# Patient Record
Sex: FEMALE | Race: WHITE
[De-identification: names, ages, dates, MRNs, and addresses within clinical notes are randomized per-mention and may not be internally consistent; named-entity substitution may affect disease eponyms.]

---

## 2022-01-06 ENCOUNTER — HOSPITAL ENCOUNTER (EMERGENCY)
Dept: HOSPITAL 11 - JP.ED | Age: 32
Discharge: HOME | End: 2022-01-06
Payer: SELF-PAY

## 2022-01-06 DIAGNOSIS — Z20.822: ICD-10-CM

## 2022-01-06 DIAGNOSIS — J18.9: Primary | ICD-10-CM

## 2022-01-06 DIAGNOSIS — Z72.0: ICD-10-CM

## 2022-01-06 LAB — SARS-COV-2 RNA RESP QL NAA+PROBE: NEGATIVE

## 2022-01-06 PROCEDURE — 81001 URINALYSIS AUTO W/SCOPE: CPT

## 2022-01-06 PROCEDURE — 99284 EMERGENCY DEPT VISIT MOD MDM: CPT

## 2022-01-06 PROCEDURE — 80053 COMPREHEN METABOLIC PANEL: CPT

## 2022-01-06 PROCEDURE — 36415 COLL VENOUS BLD VENIPUNCTURE: CPT

## 2022-01-06 PROCEDURE — 0241U: CPT

## 2022-01-06 PROCEDURE — 71046 X-RAY EXAM CHEST 2 VIEWS: CPT

## 2022-01-06 PROCEDURE — 85025 COMPLETE CBC W/AUTO DIFF WBC: CPT

## 2022-01-06 NOTE — EDM.PDOC
ED HPI GENERAL MEDICAL PROBLEM





- General


Chief Complaint: Respiratory Problem


Stated Complaint: COUGH, FEVER, SHAKY


Time Seen by Provider: 01/06/22 17:10


Source of Information: Reports: Patient, Family


History Limitations: Reports: No Limitations





- History of Present Illness


INITIAL COMMENTS - FREE TEXT/NARRATIVE: 





31-year-old female presents with a cough, malaise, nausea and generalized body 

aches for the past 5 days.  She was seen in the [place] emergency room last 

night but they just checked her for Covid and sent her home, she does not feel 

like she got worked up and taken seriously.  She feels moderately short of 

breath, she is unvaccinated.  She is a smoker.  Intermittent fevers and chills.


Onset: Gradual


Duration: Day(s): (Symptoms for 5 days)


Worsens with: Reports: Other (Activity causes increased fatigue and shortness of

breath)


Associated Symptoms: Reports: Chest Pain, Cough, Fever/Chills, Malaise, 

Shortness of Breath, Weakness.  Denies: Headaches, Loss of Appetite, 

Nausea/Vomiting





- Related Data


                                    Allergies











Allergy/AdvReac Type Severity Reaction Status Date / Time


 


No Known Allergies Allergy   Verified 01/06/22 16:37











Home Meds: 


                                    Home Meds





Ascorbic Acid [Vitamin C]  01/06/22 [History]


Escitalopram Oxalate [Lexapro]  01/06/22 [History]


cloNIDine [Catapres] 0.2 mg PO DAILY 01/06/22 [History]











Past Medical History


Respiratory History: Reports: Bronchitis, Recurrent





Social & Family History





- Tobacco Use


Tobacco Use Status *Q: Current Every Day Tobacco User


Years of Tobacco use: 13


Packs/Tins Daily: 1





- Recreational Drug Use


Recreational Drug Use: Yes


Recreational Drug Type: Reports: Marijuana/Hashish





ED ROS GENERAL





- Review of Systems


Review Of Systems: See Below


Constitutional: Denies: Fever, Chills


HEENT: Reports: Rhinitis, Other (Some hoarseness of her voice over the past 2 or

 3 days.).  Denies: Throat Pain


Respiratory: Reports: Shortness of Breath, Cough.  Denies: Sputum


Cardiovascular: Reports: Chest Pain (With cough)


GI/Abdominal: Reports: No Symptoms


: Reports: Other (Urine is dark, no dysuria)


Musculoskeletal: Reports: Muscle Pain (Generalized muscle aches and pains)


Skin: Reports: No Symptoms


Neurological: Reports: Headache (Mild headache, weakness)


Psychiatric: Reports: No Symptoms





ED EXAM, GENERAL





- Physical Exam


Exam: See Below


Exam Limited By: No Limitations


General Appearance: Alert, No Apparent Distress, Other (Patient looks tired but 

not distressed)


Eye Exam: Bilateral Eye: Normal Inspection


Head: Atraumatic


Neck: Non-Tender


Respiratory/Chest: No Respiratory Distress, Lungs Clear


Cardiovascular: Regular Rate, Rhythm


GI/Abdominal: Soft, Tender (She does react with some mild to moderate tenderness

 to palpation of the abdomen diffusely, no focal guarding or rebound tenderness)


Extremities: Normal Inspection


Neurological: Alert, Oriented


Psychiatric: Anxious


Skin Exam: Warm, Dry





Course





- Vital Signs


Last Recorded V/S: 


                                Last Vital Signs











Temp  98.2 F   01/06/22 16:35


 


Pulse  80   01/06/22 16:35


 


Resp  18   01/06/22 16:35


 


BP  158/59 H  01/06/22 16:35


 


Pulse Ox  97   01/06/22 16:35














- Orders/Labs/Meds


Orders: 


                               Active Orders 24 hr











 Category Date Time Status


 


 Isolation [COMM] Stat Oth  01/06/22 16:30 Ordered


 


 Isolation [COMM] Stat Ot  01/06/22 17:18 Ordered











Labs: 


                                Laboratory Tests











  01/06/22 01/06/22 01/06/22 Range/Units





  16:52 17:25 17:26 


 


WBC    14.2 H  (4.5-11.0)  K/uL


 


RBC    4.21  (3.30-5.50)  M/uL


 


Hgb    13.1  (12.0-15.0)  g/dL


 


Hct    38.6  (36.0-48.0)  %


 


MCV    92  (80-98)  fL


 


MCH    31  (27-31)  pg


 


MCHC    34  (32-36)  %


 


Plt Count    272  (150-400)  K/uL


 


Neut % (Auto)    54.4  (36-66)  %


 


Lymph % (Auto)    34.5  (24-44)  %


 


Mono % (Auto)    6.3 H  (2-6)  %


 


Eos % (Auto)    3.5  (2-4)  %


 


Baso % (Auto)    0.7  (0-1)  %


 


Sodium     (140-148)  mmol/L


 


Potassium     (3.6-5.2)  mmol/L


 


Chloride     (100-108)  mmol/L


 


Carbon Dioxide     (21-32)  mmol/L


 


Anion Gap     (5.0-14.0)  mmol/L


 


BUN     (7-18)  mg/dL


 


Creatinine     (0.6-1.0)  mg/dL


 


Est Cr Clr Drug Dosing     mL/min


 


Estimated GFR (MDRD)     (>60)  


 


Glucose     ()  mg/dL


 


Calcium     (8.5-10.1)  mg/dL


 


Total Bilirubin     (0.2-1.0)  mg/dL


 


AST     (15-37)  U/L


 


ALT     (12-78)  U/L


 


Alkaline Phosphatase     ()  U/L


 


Total Protein     (6.4-8.2)  g/dL


 


Albumin     (3.4-5.0)  g/dL


 


Globulin     (2.3-3.5)  g/dL


 


Albumin/Globulin Ratio     (1.2-2.2)  


 


Urine Color   Yellow   (YELLOW)  


 


Urine Appearance   Slightly cloudy A   (CLEAR)  


 


Urine pH   6.0   (5.0-8.0)  


 


Ur Specific Gravity   >= 1.030   (1.008-1.030)  


 


Urine Protein   Negative   (NEGATIVE)  mg/dL


 


Urine Glucose (UA)   Negative   (NEGATIVE)  mg/dL


 


Urine Ketones   Negative   (NEGATIVE)  mg/dL


 


Urine Occult Blood   Negative   (NEGATIVE)  


 


Urine Nitrite   Negative   (NEGATIVE)  


 


Urine Bilirubin   Negative   (NEGATIVE)  


 


Urine Urobilinogen   2.0 H   (0.2-1.0)  EU/dL


 


Ur Leukocyte Esterase   Negative   (NEGATIVE)  


 


Urine RBC   0-5   (0-5)  


 


Urine WBC   0-5   (0-5)  


 


Ur Epithelial Cells   Few   


 


Amorphous Sediment   Not seen   


 


Urine Bacteria   Few   


 


Urine Mucus   Few   


 


Influenza Type A RNA  Negative    (NEGATIVE)  


 


RSV RNA (INAAT)  Negative    (NEGATIVE)  


 


Influenza Type B RNA  Negative    (NEGATIVE)  


 


SARS-CoV-2 RNA (AL)  Negative    (NEGATIVE)  














  01/06/22 Range/Units





  17:26 


 


WBC   (4.5-11.0)  K/uL


 


RBC   (3.30-5.50)  M/uL


 


Hgb   (12.0-15.0)  g/dL


 


Hct   (36.0-48.0)  %


 


MCV   (80-98)  fL


 


MCH   (27-31)  pg


 


MCHC   (32-36)  %


 


Plt Count   (150-400)  K/uL


 


Neut % (Auto)   (36-66)  %


 


Lymph % (Auto)   (24-44)  %


 


Mono % (Auto)   (2-6)  %


 


Eos % (Auto)   (2-4)  %


 


Baso % (Auto)   (0-1)  %


 


Sodium  139 L  (140-148)  mmol/L


 


Potassium  3.4 L  (3.6-5.2)  mmol/L


 


Chloride  102  (100-108)  mmol/L


 


Carbon Dioxide  30  (21-32)  mmol/L


 


Anion Gap  10.4  (5.0-14.0)  mmol/L


 


BUN  13  (7-18)  mg/dL


 


Creatinine  0.6  (0.6-1.0)  mg/dL


 


Est Cr Clr Drug Dosing  116.24  mL/min


 


Estimated GFR (MDRD)  > 60  (>60)  


 


Glucose  71 L  ()  mg/dL


 


Calcium  8.6  (8.5-10.1)  mg/dL


 


Total Bilirubin  0.4  (0.2-1.0)  mg/dL


 


AST  26  (15-37)  U/L


 


ALT  29  (12-78)  U/L


 


Alkaline Phosphatase  102  ()  U/L


 


Total Protein  7.6  (6.4-8.2)  g/dL


 


Albumin  3.8  (3.4-5.0)  g/dL


 


Globulin  3.8 H  (2.3-3.5)  g/dL


 


Albumin/Globulin Ratio  1.0 L  (1.2-2.2)  


 


Urine Color   (YELLOW)  


 


Urine Appearance   (CLEAR)  


 


Urine pH   (5.0-8.0)  


 


Ur Specific Gravity   (1.008-1.030)  


 


Urine Protein   (NEGATIVE)  mg/dL


 


Urine Glucose (UA)   (NEGATIVE)  mg/dL


 


Urine Ketones   (NEGATIVE)  mg/dL


 


Urine Occult Blood   (NEGATIVE)  


 


Urine Nitrite   (NEGATIVE)  


 


Urine Bilirubin   (NEGATIVE)  


 


Urine Urobilinogen   (0.2-1.0)  EU/dL


 


Ur Leukocyte Esterase   (NEGATIVE)  


 


Urine RBC   (0-5)  


 


Urine WBC   (0-5)  


 


Ur Epithelial Cells   


 


Amorphous Sediment   


 


Urine Bacteria   


 


Urine Mucus   


 


Influenza Type A RNA   (NEGATIVE)  


 


RSV RNA (INAAT)   (NEGATIVE)  


 


Influenza Type B RNA   (NEGATIVE)  


 


SARS-CoV-2 RNA (AL)   (NEGATIVE)  














- Re-Assessments/Exams


Free Text/Narrative Re-Assessment/Exam: 





01/06/22 17:55


2 view chest x-ray shows a likely bilateral hilar infiltrates, viral studies are

 negative.  White count is elevated at 14,000, CMP is basically normal, there is

 no infection in the urine but she is very concentrated.  Patient was placed on 

a Z-Anjel, given Robitussin-DAC and Tessalon Perles for cough suppression and will

 concentrate on hydration.  She can recheck next week if not improving 

satisfactorily.








Departure





- Departure


Time of Disposition: 18:34


Disposition: Home, Self-Care 01


Clinical Impression: 


Pneumonia


Qualifiers:


 Pneumonia type: due to unspecified organism Laterality: right Lung location: 

middle lobe of lung Qualified Code(s): J18.9 - Pneumonia, unspecified organism








- Discharge Information


Instructions:  Community-Acquired Pneumonia, Adult, Easy-to-Read


Referrals: 


MIRZA VERDUZCO [Other]


Forms:  ED Department Discharge


Care Plan Goals: 


Take Zithromax as prescribed starting with 2 pills tonight, use Robitussin and 

Tessalon for cough suppression.  Concentrate on hydration, and increase activity

 as tolerated.  Consider rechecking in 3 or 4 days if not improving 

satisfactorily, return anytime if worsening, especially difficulty breathing.





Sepsis Event Note (ED)





- Evaluation


Sepsis Screening Result: No Definite Risk





- My Orders


Last 24 Hours: 


My Active Orders





01/06/22 16:30


Isolation [COMM] Stat 





01/06/22 17:18


Isolation [COMM] Stat 














- Assessment/Plan


Last 24 Hours: 


My Active Orders





01/06/22 16:30


Isolation [COMM] Stat 





01/06/22 17:18


Isolation [COMM] Stat

## 2022-01-07 NOTE — CR
CHEST: 2 view

 

CLINICAL HISTORY:Cough and dyspnea

 

COMPARISON:None

 

FINDINGS:  Heart size and pulmonary vascularity are normal. There is diffuse

increase in lung markings bilaterally, particularly in the perihilar region.

There are no effusions.

 

Impression: Generalized increase in lung markings is most suggestive of

pneumonitis

## 2022-01-10 ENCOUNTER — HOSPITAL ENCOUNTER (EMERGENCY)
Dept: HOSPITAL 11 - JP.ED | Age: 32
Discharge: HOME | End: 2022-01-10
Payer: SELF-PAY

## 2022-01-10 DIAGNOSIS — Z72.0: ICD-10-CM

## 2022-01-10 DIAGNOSIS — J98.01: Primary | ICD-10-CM

## 2022-01-10 NOTE — EDM.PDOC
ED HPI GENERAL MEDICAL PROBLEM





- General


Chief Complaint: Respiratory Problem


Stated Complaint: HEAVINESS IN CHEST


Time Seen by Provider: 01/10/22 21:35


Source of Information: Reports: Patient, Old Records, RN


History Limitations: Reports: No Limitations





- History of Present Illness


INITIAL COMMENTS - FREE TEXT/NARRATIVE: 





30 yo female smoker presents with mild chest tightness. Was seen about 4-5 days 

ago here for the same and was given as Z-pack which has not helped. She had a 

negative viral 4 plex panel on that visit. She thinks she is running low grade 

fevers in the 99F+ range most days. Has not been seen by her primary in follow 

up. Gets "bronchitis" often. 


Onset: Gradual


Duration: Day(s):, Getting Worse


Location: Reports: Chest


Quality: Reports: Other (mild tightness)


Severity: Mild


Improves with: Reports: None


Worsens with: Reports: Other (time)


Context: Reports: Other (See HPI)


Associated Symptoms: Reports: Chest Pain (mild tightness), Cough, Fever/Chills 

(? low grade), Other (rhinorrhea)


Treatments PTA: Reports: Other (see below) (none)


  ** Generalized


Pain Score (Numeric/FACES): 8





- Related Data


                                    Allergies











Allergy/AdvReac Type Severity Reaction Status Date / Time


 


No Known Allergies Allergy   Verified 01/10/22 21:28











Home Meds: 


                                    Home Meds





Ascorbic Acid [Vitamin C] 1 tab PO DAILY 01/06/22 [History]


Escitalopram Oxalate [Lexapro] 1 tab PO DAILY 01/06/22 [History]


cloNIDine [Catapres] 0.2 mg PO DAILY 01/06/22 [History]


Albuterol Sulfate [Albuterol Sulfate Hfa] 2 puff INH Q4H PRN #1 hfa.aer.ad 

01/10/22 [Rx]


predniSONE [Prednisone] 10 mg PO TID #15 tablet 01/10/22 [Rx]











Past Medical History


Respiratory History: Reports: Bronchitis, Recurrent





Social & Family History





- Tobacco Use


Tobacco Use Status *Q: Current Every Day Tobacco User


Years of Tobacco use: 13


Packs/Tins Daily: 1





- Caffeine Use


Caffeine Use: Reports: Soda





- Recreational Drug Use


Recreational Drug Use: Yes


Recreational Drug Type: Reports: Marijuana/Hashish





ED ROS GENERAL





- Review of Systems


Review Of Systems: See Below


Constitutional: Reports: Fever (low grade), Chills, Malaise


HEENT: Reports: Rhinitis


Respiratory: Reports: Cough.  Denies: Shortness of Breath, Sputum, Hemoptysis


Cardiovascular: Reports: Chest Pain (mild tightness)


GI/Abdominal: Reports: No Symptoms


: Reports: No Symptoms


Musculoskeletal: Reports: No Symptoms


Skin: Reports: No Symptoms


Neurological: Reports: No Symptoms


Psychiatric: Reports: No Symptoms





ED EXAM, GENERAL





- Physical Exam


Exam: See Below


Exam Limited By: No Limitations


General Appearance: Alert, WD/WN, No Apparent Distress


Eye Exam: Bilateral Eye: Normal Inspection


Ears: Normal External Exam, Normal Canal, Hearing Grossly Normal, Normal TMs


Ear Exam: Bilateral Ear: Auricle Normal, Canal Normal, TM normal


Nose: Clear Rhinorrhea


Throat/Mouth: Normal Inspection, Normal Lips, Normal Oropharynx, Normal Voice, 

No Airway Compromise


Head: Atraumatic, Normocephalic


Neck: Normal Inspection


Respiratory/Chest: No Respiratory Distress, Lungs Clear, Normal Breath Sounds, 

No Accessory Muscle Use.  No: Wheezing


Cardiovascular: Regular Rate, Rhythm, No Edema


Extremities: Normal Inspection


Neurological: Alert, Oriented, CN II-XII Intact, Normal Cognition, No 

Motor/Sensory Deficits


Psychiatric: Normal Affect, Normal Mood


Skin Exam: Warm, Dry, Intact, Normal Color, No Rash





Course





- Vital Signs


Last Recorded V/S: 


                                Last Vital Signs











Temp  36.6 C   01/10/22 21:24


 


Pulse  63   01/10/22 21:24


 


Resp  17   01/10/22 21:24


 


BP  148/79 H  01/10/22 21:24


 


Pulse Ox  95   01/10/22 21:24














- Orders/Labs/Meds


Orders: 


                               Active Orders 24 hr











 Category Date Time Status


 


 RT Aerosol Therapy [RC] ASDIRECTED Care  01/10/22 21:51 Active











Labs: 


                                Laboratory Tests











  01/10/22 01/10/22 01/10/22 Range/Units





  21:47 21:57 21:57 


 


WBC   13.9 H   (3.2-11.0)  K/uL


 


RBC   4.36   (3.77-5.24)  M/uL


 


Hgb   13.5   (11.2-15.5)  


 


Hct   39.5   (34.3-46.0)  %


 


MCV   90.6   (81.4-99.0)  fL


 


MCH   31.0 L   (31.6-35.5)  pg


 


MCHC   34.2   (31.6-35.5)  g/dL


 


Plt Count   315   (130-375)  K/uL


 


Sodium    137 L  (140-148)  mmol/L


 


Potassium    3.9  (3.6-5.2)  mmol/L


 


Chloride    102  (100-108)  mmol/L


 


Carbon Dioxide    26  (21-32)  mmol/L


 


Anion Gap    12.9  (5.0-14.0)  mmol/L


 


BUN    8  (7-18)  mg/dL


 


Creatinine    0.7  (0.6-1.0)  mg/dL


 


Est Cr Clr Drug Dosing    99.63  mL/min


 


Estimated GFR (MDRD)    > 60  (>60)  


 


Glucose    105  ()  mg/dL


 


Calcium    9.1  (8.5-10.1)  mg/dL


 


C-Reactive Protein    0.83 H  (0.0-0.3)  mg/dL


 


Urine Color  Yellow    (YELLOW)  


 


Urine Appearance  Clear    (CLEAR)  


 


Urine pH  6.0    (5.0-8.0)  


 


Ur Specific Gravity  >= 1.030    (1.008-1.030)  


 


Urine Protein  Negative    (NEGATIVE)  mg/dL


 


Urine Glucose (UA)  Negative    (NEGATIVE)  mg/dL


 


Urine Ketones  Negative    (NEGATIVE)  mg/dL


 


Urine Occult Blood  Negative    (NEGATIVE)  


 


Urine Nitrite  Negative    (NEGATIVE)  


 


Urine Bilirubin  Negative    (NEGATIVE)  


 


Urine Urobilinogen  1.0    (0.2-1.0)  EU/dL


 


Ur Leukocyte Esterase  Negative    (NEGATIVE)  


 


Urine RBC  0-5    (0-5)  


 


Urine WBC  0-5    (0-5)  


 


Ur Epithelial Cells  Few    


 


Amorphous Sediment  Not seen    


 


Urine Bacteria  Few    


 


Urine Mucus  Many    











Meds: 


Medications














Discontinued Medications














Generic Name Dose Route Start Last Admin





  Trade Name Freq  PRN Reason Stop Dose Admin


 


Albuterol/Ipratropium  3 ml  01/10/22 21:51  01/10/22 21:57





  Albuterol/Ipratropium 3.0-0.5 Mg/3 Ml Neb Soln  NEB  01/10/22 21:52  3 ml





  ONETIME ONE   Administration














- Re-Assessments/Exams


Free Text/Narrative Re-Assessment/Exam: 





01/10/22 21:56


peak flow 300/450 predicted





After a Duoneb: peak flow was 350


01/10/22 22:06








Departure





- Departure


Time of Disposition: 22:30


Disposition: Home, Self-Care 01


Condition: Good


Clinical Impression: 


 Bronchospasm








- Discharge Information


*PRESCRIPTION DRUG MONITORING PROGRAM REVIEWED*: Not Applicable


*COPY OF PRESCRIPTION DRUG MONITORING REPORT IN PATIENT TIAGO: Not Applicable


Prescriptions: 


Albuterol Sulfate [Albuterol Sulfate Hfa] 2 puff INH Q4H PRN #1 hfa.aer.ad


 PRN Reason: Wheezing


predniSONE [Prednisone] 10 mg PO TID #15 tablet


Instructions:  Steps to Quit Smoking, Easy-to-Read, Bronchospasm, Adult


Referrals: 


PCP,None [Primary Care Provider] - 


Forms:  ED Department Discharge


Additional Instructions: 


Use the 2 prescriptions sent to your pharmacy as directed. Drink enough water so

that your urine is light yellow in color(more than you have been drinking). Work

on quitting smoking. F/U with your doctor to make sure you are getting over this

current illness. 





Sepsis Event Note (ED)





- Evaluation


Sepsis Screening Result: No Definite Risk





- Focused Exam


Vital Signs: 


                                   Vital Signs











  Temp Pulse Resp BP Pulse Ox


 


 01/10/22 21:24  36.6 C  63  17  148/79 H  95


 


 01/10/22 21:19  36.6 C  63  17  148/79 H  63 L














- My Orders


Last 24 Hours: 


My Active Orders





01/10/22 21:51


RT Aerosol Therapy [RC] ASDIRECTED 














- Assessment/Plan


Last 24 Hours: 


My Active Orders





01/10/22 21:51


RT Aerosol Therapy [RC] ASDIRECTED

## 2022-03-17 ENCOUNTER — HOSPITAL ENCOUNTER (EMERGENCY)
Dept: HOSPITAL 11 - JP.ED | Age: 32
Discharge: HOME | End: 2022-03-17
Payer: SELF-PAY

## 2022-03-17 DIAGNOSIS — N94.6: Primary | ICD-10-CM

## 2022-03-17 DIAGNOSIS — Z72.0: ICD-10-CM

## 2022-03-26 ENCOUNTER — HOSPITAL ENCOUNTER (EMERGENCY)
Dept: HOSPITAL 11 - JP.ED | Age: 32
LOS: 1 days | Discharge: HOME | End: 2022-03-27
Payer: SELF-PAY

## 2022-03-26 DIAGNOSIS — J20.9: Primary | ICD-10-CM

## 2022-03-26 DIAGNOSIS — J42: ICD-10-CM

## 2022-04-12 ENCOUNTER — HOSPITAL ENCOUNTER (EMERGENCY)
Dept: HOSPITAL 11 - JP.ED | Age: 32
LOS: 1 days | Discharge: HOME | End: 2022-04-13
Payer: SELF-PAY

## 2022-04-12 DIAGNOSIS — R07.89: Primary | ICD-10-CM

## 2022-04-12 DIAGNOSIS — Z72.0: ICD-10-CM
